# Patient Record
Sex: FEMALE | Race: BLACK OR AFRICAN AMERICAN | NOT HISPANIC OR LATINO | RURAL
[De-identification: names, ages, dates, MRNs, and addresses within clinical notes are randomized per-mention and may not be internally consistent; named-entity substitution may affect disease eponyms.]

---

## 2021-12-15 ENCOUNTER — OFFICE VISIT (OUTPATIENT)
Dept: OBSTETRICS AND GYNECOLOGY | Facility: CLINIC | Age: 31
End: 2021-12-15
Payer: COMMERCIAL

## 2021-12-15 VITALS
BODY MASS INDEX: 28.89 KG/M2 | RESPIRATION RATE: 16 BRPM | WEIGHT: 169.25 LBS | HEIGHT: 64 IN | SYSTOLIC BLOOD PRESSURE: 120 MMHG | DIASTOLIC BLOOD PRESSURE: 70 MMHG

## 2021-12-15 DIAGNOSIS — Z01.419 ENCOUNTER FOR ANNUAL ROUTINE GYNECOLOGICAL EXAMINATION: ICD-10-CM

## 2021-12-15 DIAGNOSIS — Z12.4 SCREENING FOR MALIGNANT NEOPLASM OF THE CERVIX: Primary | ICD-10-CM

## 2021-12-15 DIAGNOSIS — Z87.42 HISTORY OF POLYCYSTIC OVARIAN SYNDROME: ICD-10-CM

## 2021-12-15 DIAGNOSIS — N92.6 IRREGULAR MENSES: ICD-10-CM

## 2021-12-15 LAB
B-HCG UR QL: NEGATIVE
CTP QC/QA: YES

## 2021-12-15 PROCEDURE — 88142 CYTOPATH C/V THIN LAYER: CPT | Mod: GCY | Performed by: OBSTETRICS & GYNECOLOGY

## 2021-12-15 PROCEDURE — 99203 OFFICE O/P NEW LOW 30 MIN: CPT | Mod: PBBFAC | Performed by: OBSTETRICS & GYNECOLOGY

## 2021-12-15 PROCEDURE — 99395 PREV VISIT EST AGE 18-39: CPT | Mod: S$PBB,,, | Performed by: OBSTETRICS & GYNECOLOGY

## 2021-12-15 PROCEDURE — 99395 PR PREVENTIVE VISIT,EST,18-39: ICD-10-PCS | Mod: S$PBB,,, | Performed by: OBSTETRICS & GYNECOLOGY

## 2021-12-15 PROCEDURE — 81025 URINE PREGNANCY TEST: CPT | Mod: PBBFAC | Performed by: OBSTETRICS & GYNECOLOGY

## 2021-12-17 ENCOUNTER — PATIENT MESSAGE (OUTPATIENT)
Dept: OBSTETRICS AND GYNECOLOGY | Facility: CLINIC | Age: 31
End: 2021-12-17
Payer: COMMERCIAL

## 2021-12-17 LAB
GH SERPL-MCNC: NORMAL NG/ML
INSULIN SERPL-ACNC: NORMAL U[IU]/ML
LAB AP CLINICAL INFORMATION: NORMAL
LAB AP GYN INTERPRETATION: NEGATIVE
LAB AP PAP DISCLAIMER COMMENTS: NORMAL
RENIN PLAS-CCNC: NORMAL NG/ML/H

## 2024-12-10 ENCOUNTER — HOSPITAL ENCOUNTER (EMERGENCY)
Facility: HOSPITAL | Age: 34
Discharge: HOME | End: 2024-12-10
Attending: EMERGENCY MEDICINE

## 2024-12-10 VITALS
HEART RATE: 84 BPM | BODY MASS INDEX: 30.05 KG/M2 | WEIGHT: 176 LBS | OXYGEN SATURATION: 100 % | RESPIRATION RATE: 18 BRPM | DIASTOLIC BLOOD PRESSURE: 99 MMHG | SYSTOLIC BLOOD PRESSURE: 158 MMHG | HEIGHT: 64 IN | TEMPERATURE: 98 F

## 2024-12-10 DIAGNOSIS — S80.01XA CONTUSION OF RIGHT KNEE, INITIAL ENCOUNTER: Primary | ICD-10-CM
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
## 2024-12-10 DIAGNOSIS — S76.011A STRAIN OF RIGHT HIP, INITIAL ENCOUNTER: ICD-10-CM

## 2024-12-10 LAB — HCG UR QL IA.RAPID: NEGATIVE

## 2024-12-10 PROCEDURE — 63600175 PHARM REV CODE 636 W HCPCS: Performed by: EMERGENCY MEDICINE

## 2024-12-10 PROCEDURE — 81025 URINE PREGNANCY TEST: CPT | Performed by: EMERGENCY MEDICINE

## 2024-12-10 PROCEDURE — 99284 EMERGENCY DEPT VISIT MOD MDM: CPT | Mod: ,,, | Performed by: EMERGENCY MEDICINE

## 2024-12-10 PROCEDURE — 99284 EMERGENCY DEPT VISIT MOD MDM: CPT | Mod: 25

## 2024-12-10 PROCEDURE — 96372 THER/PROPH/DIAG INJ SC/IM: CPT | Performed by: EMERGENCY MEDICINE

## 2024-12-10 RX ORDER — KETOROLAC TROMETHAMINE 30 MG/ML
30 INJECTION, SOLUTION INTRAMUSCULAR; INTRAVENOUS
Status: COMPLETED | OUTPATIENT
Start: 2024-12-10 | End: 2024-12-10

## 2024-12-10 RX ORDER — ORPHENADRINE CITRATE 30 MG/ML
60 INJECTION INTRAMUSCULAR; INTRAVENOUS
Status: COMPLETED | OUTPATIENT
Start: 2024-12-10 | End: 2024-12-10

## 2024-12-10 RX ADMIN — KETOROLAC TROMETHAMINE 30 MG: 30 INJECTION, SOLUTION INTRAMUSCULAR at 05:12

## 2024-12-10 RX ADMIN — ORPHENADRINE CITRATE 60 MG: 30 INJECTION, SOLUTION INTRAMUSCULAR; INTRAVENOUS at 05:12

## 2024-12-10 NOTE — ED PROVIDER NOTES
Encounter Date: 12/10/2024       History     Chief Complaint   Patient presents with    Fall     Rt knee pain post fall     Patient presents with right anterior knee pain and right hip pain after a fall at work, fell on her right knee.  Denies other injury, no loss of consciousness, no head injury.  No neurologic deficits.      Review of patient's allergies indicates:   Allergen Reactions    Bactrim [sulfamethoxazole-trimethoprim]      History reviewed. No pertinent past medical history.  History reviewed. No pertinent surgical history.  Family History   Problem Relation Name Age of Onset    Hypertension Maternal Grandfather      Hypertension Mother       Social History     Tobacco Use    Smoking status: Never    Smokeless tobacco: Never   Substance Use Topics    Alcohol use: Not Currently    Drug use: Never     Review of Systems   Constitutional: Negative.  Negative for fever.   HENT: Negative.     Eyes: Negative.    Respiratory: Negative.  Negative for shortness of breath.    Cardiovascular: Negative.  Negative for chest pain and leg swelling.   Gastrointestinal: Negative.  Negative for abdominal pain.   Genitourinary: Negative.  Negative for hematuria.   Musculoskeletal:  Positive for gait problem (reports pain with weight-bearing right lower extremity). Negative for back pain, joint swelling, neck pain and neck stiffness.   Skin: Negative.  Negative for wound.   Neurological:  Negative for dizziness, tremors, seizures, syncope, facial asymmetry, speech difficulty, weakness, light-headedness, numbness and headaches.   Psychiatric/Behavioral: Negative.     All other systems reviewed and are negative.      Physical Exam     Initial Vitals [12/10/24 0258]   BP Pulse Resp Temp SpO2   (!) 158/99 84 18 98.3 °F (36.8 °C) 100 %      MAP       --         Physical Exam    Nursing note and vitals reviewed.  Constitutional: She appears well-developed and well-nourished. No distress.   HENT:   Head: Atraumatic.   Right Ear:  External ear normal.   Left Ear: External ear normal.   Nose: Nose normal. Mouth/Throat: Oropharynx is clear and moist.   Eyes: Conjunctivae and EOM are normal. Pupils are equal, round, and reactive to light.   Neck: Neck supple. No JVD present.   Normal range of motion.  Cardiovascular:  Normal rate, regular rhythm, normal heart sounds and intact distal pulses.           No murmur heard.  Pulmonary/Chest: Breath sounds normal. No stridor. No respiratory distress. She has no wheezes. She has no rhonchi. She has no rales.   Abdominal: Abdomen is soft. Bowel sounds are normal. She exhibits no distension. There is no abdominal tenderness.   Musculoskeletal:         General: Tenderness (patient has diffuse tenderness of the right hip area with range of motion, has anterior tenderness right knee.) present. No edema. Normal range of motion.      Cervical back: Normal range of motion and neck supple.      Right knee: No effusion or bony tenderness. Normal range of motion. Tenderness present. No LCL laxity, MCL laxity or ACL laxity.      Left knee: Normal.        Legs:      Lymphadenopathy:     She has no cervical adenopathy.   Neurological: She is alert and oriented to person, place, and time. She has normal strength. No cranial nerve deficit or sensory deficit. GCS score is 15. GCS eye subscore is 4. GCS verbal subscore is 5. GCS motor subscore is 6.   Skin: Skin is warm and dry. No rash noted. No erythema. No pallor.   Psychiatric: She has a normal mood and affect.         Medical Screening Exam   See Full Note    ED Course   Procedures  Labs Reviewed   HCG QUALITATIVE URINE - Normal       Result Value    HCG Qualitative, Urine Negative            Imaging Results              X-Ray Hip 2 or 3 views Right with Pelvis when performed (In process)                      X-Ray Knee 3 View Right (In process)                      Medications   ketorolac injection 30 mg (30 mg Intramuscular Given 12/10/24 0512)   orphenadrine  injection 60 mg (60 mg Intramuscular Given 12/10/24 0512)     Medical Decision Making  Differential diagnosis includes contusion, patella fracture, sprain right knee  and/or right hip.    Initial review of x-ray of the right hip and right knee shows no evidence of fracture, awaiting radiologist's report.  Patient treated with Toradol and Norflex IM, may use over-the-counter Tylenol or naproxen as directed as needed for pain.  Follow up with occupational health physician as needed if symptoms persist, this she should arrange through her employer.  Discharge and follow up instructions given.    Amount and/or Complexity of Data Reviewed  Labs: ordered. Decision-making details documented in ED Course.  Radiology: ordered and independent interpretation performed. Decision-making details documented in ED Course.    Risk  Prescription drug management.               ED Course as of 12/10/24 0519   Tue Dec 10, 2024   0440 HCG Qualitative Urine  And hCG is neck [LM]   0441 X-Ray Hip 2 or 3 views Right with Pelvis when performed  Review of x-ray of the right and pelvis shows no evidence of fracture or dislocation [LM]   0441 X-Ray Knee 3 View Right  Review of x-ray of the right knee shows no evidence of fracture or dislocation. [LM]      ED Course User Index  [LM] Izaiah Hernandez DO                           Clinical Impression:   Final diagnoses:  [W19.XXXA] Fall  [S80.01XA] Contusion of right knee, initial encounter (Primary)  [S76.011A] Strain of right hip, initial encounter        ED Disposition Condition    Discharge Stable          ED Prescriptions    None       Follow-up Information       Follow up With Specialties Details Why Contact Info    Follow up with occupational health physician as referred from your employer  Schedule an appointment as soon as possible for a visit in 1 week To recheck; sooner if worse, not improving, or if any new symptoms.              Izaiah Hernandez DO  12/10/24 0520

## 2024-12-10 NOTE — Clinical Note
"Sahil "Ashleydiego" Olman was seen and treated in our emergency department on 12/10/2024.  She may return to work on 12/11/2024.       If you have any questions or concerns, please don't hesitate to call.      Izaiah Hernandez, DO"

## 2024-12-10 NOTE — ED TRIAGE NOTES
"PT BROUGHT IN BY SUPERVISOR FROM [facility] WITH C/O RIGHT KNEE PAIN FROM A GROUND LEVEL FALL "SLIPPED AND FELL"/PT REPORT SHE CAN "PUT JUST A LITTLE WEIGHT ON IT"  "

## 2024-12-30 ENCOUNTER — OFFICE VISIT (OUTPATIENT)
Dept: FAMILY MEDICINE | Facility: CLINIC | Age: 34
End: 2024-12-30

## 2024-12-30 VITALS
WEIGHT: 177 LBS | TEMPERATURE: 98 F | HEIGHT: 64 IN | BODY MASS INDEX: 30.22 KG/M2 | DIASTOLIC BLOOD PRESSURE: 90 MMHG | SYSTOLIC BLOOD PRESSURE: 136 MMHG | OXYGEN SATURATION: 99 % | HEART RATE: 95 BPM

## 2024-12-30 DIAGNOSIS — M25.561 ACUTE PAIN OF RIGHT KNEE: Primary | ICD-10-CM

## 2024-12-30 RX ORDER — TRAMADOL HYDROCHLORIDE 50 MG/1
50 TABLET ORAL EVERY 6 HOURS PRN
COMMUNITY

## 2024-12-30 NOTE — PROGRESS NOTES
Ronal Paz MD   [facility]  [address] [place] [address]     PATIENT NAME: Sahil Thurman  : 1990  DATE: 24  MRN: [id number]      Billing Provider: Ronal Paz MD  Level of Service: [place] OFFICE/OUTPT VISIT, EST, LEVL III, 20-29 MIN  Patient PCP Information       Provider PCP Type    Ronal Pza MD General            Reason for Visit / Chief Complaint: Leg Pain (Patient states she continues to have pain to lower back and leg pain. States tramadol is making patient have panic attacks, hot flashes, and makes her stomach upset. Patient is taking aleve OTC states it does help a little with the pain. Patient is using a knee brace and crutch. States her knee hurts with bending.  /) and Low-back Pain (Workers Comp: Patient was letting a dock door down at work Dec 10th states she turned to get back in her truck and tripped and fell. Patient went to [facility] ER xray of hip and knee done. Patient continues to have pain to low back, right hip, and down into her right knee. Patient went to [facility] ER Dec 11th lumbar xray and leg xray was given tramadol and gave an excuse for a week off of work. )         History of Present Illness / Problem Focused Workflow     Sahil Thurman presents to the clinic with Leg Pain (Patient states she continues to have pain to lower back and leg pain. States tramadol is making patient have panic attacks, hot flashes, and makes her stomach upset. Patient is taking aleve OTC states it does help a little with the pain. Patient is using a knee brace and crutch. States her knee hurts with bending.  /) and Low-back Pain (Workers Comp: Patient was letting a dock door down at work Dec 10th states she turned to get back in her truck and tripped and fell. Patient went to [facility] ER xray of hip and knee done. Patient continues to have pain to low back, right hip, and down into her right knee.  Patient went to [facility] ER Dec 11th lumbar xray and leg xray was given tramadol and gave an excuse for a week off of work. )     HPI    Review of Systems     Review of Systems   Constitutional:  Negative for activity change, appetite change, fatigue and fever.   HENT:  Negative for nasal congestion, ear pain, hearing loss, sinus pressure/congestion and sore throat.    Respiratory:  Negative for cough, chest tightness and shortness of breath.    Cardiovascular:  Negative for chest pain and palpitations.   Gastrointestinal:  Negative for abdominal pain and fecal incontinence.   Genitourinary:  Negative for bladder incontinence and difficulty urinating.   Musculoskeletal:  Positive for arthralgias, gait problem and leg pain.   Integumentary:  Negative for rash.   Neurological:  Negative for dizziness and headaches.        Medical / Social / Family History   History reviewed. No pertinent past medical history.    History reviewed. No pertinent surgical history.    Social History  Sahil Thurman  reports that she has never smoked. She has never used smokeless tobacco. She reports that she does not currently use alcohol. She reports that she does not use drugs.    Family History  Sahil Thurman  family history includes Hypertension in her maternal grandfather and mother.    Medications and Allergies     Medications  No outpatient medications have been marked as taking for the 12/30/24 encounter (Office Visit) with Ronal Paz MD.       Allergies  Review of patient's allergies indicates:   Allergen Reactions    Bactrim [sulfamethoxazole-trimethoprim]        Physical Examination     Vitals:    12/30/24 1420   BP: (!) 136/90   Pulse: 95   Temp: 98 °F (36.7 °C)     Physical Exam  Constitutional:       General: She is not in acute distress.     Appearance: She is not ill-appearing.   HENT:      Head: Normocephalic and atraumatic.      Right Ear: Tympanic membrane and ear canal normal.       Left Ear: Tympanic membrane and ear canal normal.      Nose: Nose normal. No congestion or rhinorrhea.   Eyes:      Pupils: Pupils are equal, round, and reactive to light.   Cardiovascular:      Rate and Rhythm: Normal rate and regular rhythm.      Pulses: Normal pulses.      Heart sounds: No murmur heard.  Pulmonary:      Effort: No respiratory distress.      Breath sounds: No wheezing, rhonchi or rales.   Abdominal:      General: Bowel sounds are normal.      Palpations: Abdomen is soft.      Tenderness: There is no abdominal tenderness.      Hernia: No hernia is present.   Musculoskeletal:         General: Tenderness (knee tender with any touch or movement. exceedingly so.) present.      Cervical back: Normal range of motion and neck supple.   Lymphadenopathy:      Cervical: No cervical adenopathy.   Skin:     General: Skin is warm and dry.   Neurological:      Mental Status: She is alert.   Psychiatric:         Behavior: Behavior normal.         Thought Content: Thought content normal.          Assessment and Plan (including Health Maintenance)   :    Plan:         Health Maintenance Due   Topic Date Due    Hepatitis C Screening  Never done    Lipid Panel  Never done    HIV Screening  Never done    TETANUS VACCINE  Never done    Influenza Vaccine (1) Never done    COVID-19 Vaccine (1 - 2024-25 season) Never done    Cervical Cancer Screening  12/15/2024       Problem List Items Addressed This Visit    None  Visit Diagnoses       Acute pain of right knee    -  Primary    Relevant Orders    MRI Knee Without Contrast Right (Completed)          Acute pain of right knee  -     MRI Knee Without Contrast Right; Future; Expected date: 12/30/2024       Health Maintenance Topics with due status: Not Due       Topic Last Completion Date    RSV Vaccine (Age 60+ and Pregnant patients) Not Due       Procedures     No future appointments.     No follow-ups on file.       Signature:  Ronal Paz MD  [facility]  [facility]  [address] [place] [address]  [phone] Phone  [phone] Fax    Date of encounter: 12/30/24

## 2024-12-31 ENCOUNTER — HOSPITAL ENCOUNTER (OUTPATIENT)
Dept: RADIOLOGY | Facility: HOSPITAL | Age: 34
Discharge: HOME | End: 2024-12-31
Attending: FAMILY MEDICINE

## 2024-12-31 DIAGNOSIS — M25.561 ACUTE PAIN OF RIGHT KNEE: ICD-10-CM

## 2024-12-31 PROCEDURE — 73721 MRI JNT OF LWR EXTRE W/O DYE: CPT | Mod: 26,RT,, | Performed by: RADIOLOGY

## 2024-12-31 PROCEDURE — 73721 MRI JNT OF LWR EXTRE W/O DYE: CPT | Mod: TC,RT

## 2025-01-02 ENCOUNTER — OFFICE VISIT (OUTPATIENT)
Dept: FAMILY MEDICINE | Facility: CLINIC | Age: 35
End: 2025-01-02

## 2025-01-02 VITALS
WEIGHT: 178.81 LBS | HEART RATE: 95 BPM | OXYGEN SATURATION: 99 % | SYSTOLIC BLOOD PRESSURE: 128 MMHG | BODY MASS INDEX: 30.53 KG/M2 | DIASTOLIC BLOOD PRESSURE: 88 MMHG | TEMPERATURE: 99 F | HEIGHT: 64 IN

## 2025-01-02 DIAGNOSIS — M25.561 ACUTE PAIN OF RIGHT KNEE: Primary | ICD-10-CM

## 2025-01-02 DIAGNOSIS — M54.50 ACUTE BILATERAL LOW BACK PAIN WITHOUT SCIATICA: ICD-10-CM

## 2025-01-02 NOTE — PROGRESS NOTES
Ronal Paz MD   [facility]  [address] [place] [address]     PATIENT NAME: Sahil Thurman  : 1990  DATE: 25  MRN: [id number]      Billing Provider: Ronal Paz MD  Level of Service: [place] OFFICE/OUTPT VISIT, EST, LEVL III, 20-29 MIN  Patient PCP Information       Provider PCP Type    Ronal Paz MD General            Reason for Visit / Chief Complaint: Knee Pain (Workers Comp: MRI done on knee Dec 30th. Patient continues having pain to right knee.), Low-back Pain (Constant low back pain since the fall. Patient states pain goes all the way across. Patient states pain has not gotten worse but has been the same since the fall.), and Hip Pain (Constant right hip pain, with pain that goes into top of right leg. Patient states aleve has stopped helping with the pain. Has taken a few tramadol since. )         History of Present Illness / Problem Focused Workflow     Sahil Thurman presents to the clinic with Knee Pain (Workers Comp: MRI done on knee Dec 30th. Patient continues having pain to right knee.), Low-back Pain (Constant low back pain since the fall. Patient states pain goes all the way across. Patient states pain has not gotten worse but has been the same since the fall.), and Hip Pain (Constant right hip pain, with pain that goes into top of right leg. Patient states aleve has stopped helping with the pain. Has taken a few tramadol since. )     HPI    Review of Systems     Review of Systems   Constitutional:  Negative for activity change, appetite change, fatigue and fever.   HENT:  Negative for nasal congestion, ear pain, hearing loss, sinus pressure/congestion and sore throat.    Respiratory:  Negative for cough, chest tightness and shortness of breath.    Cardiovascular:  Negative for chest pain and palpitations.   Gastrointestinal:  Negative for abdominal pain and fecal incontinence.   Genitourinary:  Negative for bladder  incontinence and difficulty urinating.   Musculoskeletal:  Positive for arthralgias, gait problem and leg pain.   Integumentary:  Negative for rash.   Neurological:  Negative for dizziness and headaches.        Medical / Social / Family History   History reviewed. No pertinent past medical history.    History reviewed. No pertinent surgical history.    Social History  Sahil Thurman  reports that she has never smoked. She has never used smokeless tobacco. She reports that she does not currently use alcohol. She reports that she does not use drugs.    Family History  Sahil Thurman  family history includes Hypertension in her maternal grandfather and mother.    Medications and Allergies     Medications  Outpatient Medications Marked as Taking for the 1/2/25 encounter (Office Visit) with Ronal Paz MD   Medication Sig Dispense Refill    traMADoL (ULTRAM) 50 mg tablet Take 50 mg by mouth every 6 (six) hours as needed for Pain.         Allergies  Review of patient's allergies indicates:   Allergen Reactions    Bactrim [sulfamethoxazole-trimethoprim]        Physical Examination     Vitals:    01/02/25 0805   BP: 128/88   Pulse: 95   Temp: 98.5 °F (36.9 °C)     Physical Exam  Constitutional:       General: She is not in acute distress.     Appearance: She is not ill-appearing.   HENT:      Head: Normocephalic and atraumatic.      Right Ear: Tympanic membrane and ear canal normal.      Left Ear: Tympanic membrane and ear canal normal.      Nose: Nose normal. No congestion or rhinorrhea.   Eyes:      Pupils: Pupils are equal, round, and reactive to light.   Cardiovascular:      Rate and Rhythm: Normal rate and regular rhythm.      Pulses: Normal pulses.      Heart sounds: No murmur heard.  Pulmonary:      Effort: No respiratory distress.      Breath sounds: No wheezing, rhonchi or rales.   Abdominal:      General: Bowel sounds are normal.      Palpations: Abdomen is soft.      Tenderness: There is no  abdominal tenderness.      Hernia: No hernia is present.   Musculoskeletal:         General: Tenderness (right knee and low back) present.      Cervical back: Normal range of motion and neck supple.   Lymphadenopathy:      Cervical: No cervical adenopathy.   Skin:     General: Skin is warm and dry.   Neurological:      Mental Status: She is alert.   Psychiatric:         Behavior: Behavior normal.         Thought Content: Thought content normal.          Assessment and Plan (including Health Maintenance)   :    Plan:         Health Maintenance Due   Topic Date Due    Hepatitis C Screening  Never done    Lipid Panel  Never done    HIV Screening  Never done    TETANUS VACCINE  Never done    Influenza Vaccine (1) Never done    COVID-19 Vaccine (1 - 2024-25 season) Never done    Cervical Cancer Screening  12/15/2024       Problem List Items Addressed This Visit    None  Visit Diagnoses       Acute pain of right knee    -  Primary    Acute bilateral low back pain without sciatica              Acute pain of right knee    Acute bilateral low back pain without sciatica       Health Maintenance Topics with due status: Not Due       Topic Last Completion Date    RSV Vaccine (Age 60+ and Pregnant patients) Not Due       Procedures     No future appointments.     No follow-ups on file.       Signature:  Ronal Paz MD  [facility]  [address] [place] [address]  [phone] Phone  [phone] Fax    Date of encounter: 1/2/25